# Patient Record
Sex: MALE
[De-identification: names, ages, dates, MRNs, and addresses within clinical notes are randomized per-mention and may not be internally consistent; named-entity substitution may affect disease eponyms.]

---

## 2022-08-02 ENCOUNTER — HOSPITAL ENCOUNTER (OUTPATIENT)
Dept: HOSPITAL 75 - PREOP | Age: 62
End: 2022-08-02
Attending: SPECIALIST
Payer: COMMERCIAL

## 2022-08-02 DIAGNOSIS — Z01.818: Primary | ICD-10-CM

## 2022-08-05 ENCOUNTER — HOSPITAL ENCOUNTER (OUTPATIENT)
Dept: HOSPITAL 75 - SDC | Age: 62
Discharge: HOME | End: 2022-08-05
Attending: SPECIALIST
Payer: COMMERCIAL

## 2022-08-05 VITALS — WEIGHT: 177.47 LBS | BODY MASS INDEX: 30.3 KG/M2 | HEIGHT: 64.02 IN

## 2022-08-05 VITALS — SYSTOLIC BLOOD PRESSURE: 104 MMHG | DIASTOLIC BLOOD PRESSURE: 69 MMHG

## 2022-08-05 DIAGNOSIS — H40.20X0: Primary | ICD-10-CM

## 2022-08-05 NOTE — OPHTHALMOLOGIST PRE-OP NOTE
Pre-Operative Progress Note


H&P Reviewed


The H&P was reviewed, patient examined and no changes noted.


Date H&P Reviewed:  Aug 5, 2022


Time H&P Reviewed:  11:30


Pre-Op Dx


Narrow angle glaucoma, Right Eye











SARAH TSANG MD              Aug 5, 2022 12:49

## 2022-08-05 NOTE — OPHTHALMOLOGY OPERATIVE REPORT
YAG Iridotomy


PREOPERATIVE DIAGNOSIS:   Narrow angle glaucoma, Right Eye


POSTOPERATIVE DIAGNOSIS: Narrow angle glaucoma, Right Eye





PROCEDURE: YAG Iridotomy right eye





SURGEON: Gagandeep Tsang 





ANESTHESIA: Topical anesthesia





COMPLICATIONS: None





ESTIMATED BLOOD LOSS: Minimal 





DESCRIPTION OF PROCEDURE:


After proper informed consent was obtained, that patients right eye received 

one drop of tetracaine and one drop of pilocarpine 4%.  The patient received 1 

drop of timolol 0.5% five minutes after the pilocarpine.  The iridotomy lens was

placed on the patients eye.  After this the patient was moved to the YAG laser 

and using a power of [ 7.0] millijoules [4 ] bursts were used to fashion a 

patent iridotomy.  The patient tolerated the procedure well without 

complications and the patients pressure was [ 15] shortly after the laser











GAGANDEEP TSANG MD              Aug 5, 2022 12:51

## 2022-08-16 ENCOUNTER — HOSPITAL ENCOUNTER (OUTPATIENT)
Dept: HOSPITAL 75 - PREOP | Age: 62
End: 2022-08-16
Attending: SPECIALIST
Payer: COMMERCIAL

## 2022-08-16 DIAGNOSIS — Z01.818: Primary | ICD-10-CM

## 2022-08-19 ENCOUNTER — HOSPITAL ENCOUNTER (OUTPATIENT)
Dept: HOSPITAL 75 - SDC | Age: 62
Discharge: HOME | End: 2022-08-19
Attending: SPECIALIST
Payer: COMMERCIAL

## 2022-08-19 VITALS — DIASTOLIC BLOOD PRESSURE: 70 MMHG | SYSTOLIC BLOOD PRESSURE: 99 MMHG

## 2022-08-19 VITALS — BODY MASS INDEX: 30.67 KG/M2 | HEIGHT: 63.94 IN | WEIGHT: 177.47 LBS

## 2022-08-19 DIAGNOSIS — H40.20X0: Primary | ICD-10-CM

## 2022-08-19 NOTE — OPHTHALMOLOGIST PRE-OP NOTE
Pre-Operative Progress Note


H&P Reviewed


The H&P was reviewed, patient examined and no changes noted.


Date H&P Reviewed:  Aug 19, 2022


Time H&P Reviewed:  10:22


Pre-Op Dx


PREOPERATIVE DIAGNOSIS:   Narrow angle glaucoma, Left Eye


POSTOPERATIVE DIAGNOSIS: Narrow angle glaucoma, Left Eye





PROCEDURE: YAG Iridotomy left eye





SURGEON: Gagandeep Tsang 





ANESTHESIA: Topical anesthesia





COMPLICATIONS: None





ESTIMATED BLOOD LOSS: Minimal 





DESCRIPTION OF PROCEDURE:


After proper informed consent was obtained, that patients left eye received one

drop of tetracaine and one drop of pilocarpine 4%.  The patient received 1 drop 

of timolol 0.5% five minutes after the pilocarpine.  The iridotomy lens was 

placed on the patients eye.  The patient was then placed at the argon laser and

using a power of [ ] milliwatts, duration of [ ], and spot size of [ ], [ ] 

burst were used to flatten the iris.  After this the patient was moved to the 

YAG laser and using a power of [ ] millijoules [ ] bursts were used to fashion a

patent iridotomy.  The patient tolerated the procedure well without 

complications and the patients pressure was [ ] shortly after the laser











GAGANDEEP TSANG MD             Aug 19, 2022 10:46 ,

## 2022-08-19 NOTE — OPHTHALMOLOGY OPERATIVE REPORT
YAG Iridotomy


PREOPERATIVE DIAGNOSIS:   Narrow angle glaucoma, Left Eye


POSTOPERATIVE DIAGNOSIS: Narrow angle glaucoma, Left Eye





PROCEDURE: YAG Iridotomy left eye





SURGEON: Gagandeep Tsang 





ANESTHESIA: Topical anesthesia





COMPLICATIONS: None





ESTIMATED BLOOD LOSS: Minimal 





DESCRIPTION OF PROCEDURE:


After proper informed consent was obtained, that patients left eye received one

drop of tetracaine and one drop of pilocarpine 4%.  The patient received 1 drop 

of timolol 0.5% five minutes after the pilocarpine.  The iridotomy lens was 

placed on the patients eye. After this the patient was moved to the YAG laser 

and using a power of [7.0 ] millijoules [5 ] bursts were used to fashion a 

patent iridotomy.  The patient tolerated the procedure well without 

complications and the patients pressure was [15 ] shortly after the laser











GAGANDEEP TSANG MD             Aug 19, 2022 10:46

## 2023-04-05 ENCOUNTER — HOSPITAL ENCOUNTER (OUTPATIENT)
Dept: HOSPITAL 75 - PREOP | Age: 63
LOS: 36 days | Discharge: HOME | End: 2023-05-11
Attending: SURGERY
Payer: COMMERCIAL

## 2023-04-05 VITALS — WEIGHT: 171.08 LBS | HEIGHT: 64.02 IN | BODY MASS INDEX: 29.21 KG/M2

## 2023-04-05 DIAGNOSIS — Z01.818: Primary | ICD-10-CM

## 2023-05-15 ENCOUNTER — HOSPITAL ENCOUNTER (OUTPATIENT)
Dept: HOSPITAL 75 - ENDO | Age: 63
Discharge: HOME | End: 2023-05-15
Attending: SURGERY
Payer: COMMERCIAL

## 2023-05-15 VITALS — BODY MASS INDEX: 29.21 KG/M2 | WEIGHT: 171.08 LBS | HEIGHT: 64.02 IN

## 2023-05-15 VITALS — DIASTOLIC BLOOD PRESSURE: 81 MMHG | SYSTOLIC BLOOD PRESSURE: 116 MMHG

## 2023-05-15 VITALS — SYSTOLIC BLOOD PRESSURE: 131 MMHG | DIASTOLIC BLOOD PRESSURE: 94 MMHG

## 2023-05-15 VITALS — DIASTOLIC BLOOD PRESSURE: 79 MMHG | SYSTOLIC BLOOD PRESSURE: 106 MMHG

## 2023-05-15 VITALS — SYSTOLIC BLOOD PRESSURE: 109 MMHG | DIASTOLIC BLOOD PRESSURE: 78 MMHG

## 2023-05-15 DIAGNOSIS — Z12.11: Primary | ICD-10-CM

## 2023-05-15 DIAGNOSIS — K64.8: ICD-10-CM

## 2023-05-15 DIAGNOSIS — E11.9: ICD-10-CM

## 2023-05-15 DIAGNOSIS — K57.30: ICD-10-CM

## 2023-05-15 DIAGNOSIS — Z79.84: ICD-10-CM

## 2023-05-15 DIAGNOSIS — Z87.891: ICD-10-CM

## 2023-05-15 PROCEDURE — 82947 ASSAY GLUCOSE BLOOD QUANT: CPT

## 2023-05-15 NOTE — PROGRESS NOTE-POST OPERATIVE
Post-Operative Progess Note


Surgeon (s)/Assistant (s)


Surgeon


ELIAN ROSE DO


Assistant:  RUTH Cisneros student





Pre-Operative Diagnosis


Screening colon





Post-Operative Diagnosis





Diverticula


int hemorrhoids





Procedure & Operative Findings


Date of Procedure


5/15/23


Procedure Performed/Findings


Colonoscopy 





PROCEDURE NOTE:


After informed consent was obtained, the patient was brought to the endoscopy


suite, placed in bed in left lateral decubitus position.  He  was administered


IV sedation by the CRNA who then monitored his vitals the entire time, heart


rate, blood pressure and pulse ox and the scope was inserted, pushed all the way


to about 150 cm and pushed into the cecum, took a picture of appendiceal orifice

and 


noted the ileocecal valve. Then slowly withdrew the scope insufflating to look 

circumferentially 


at the walls starting in the cecum, up the ascending colon to the hepatic 

flexure, then down the 


transverse colon, splenic flexure, into the descending colon down in the sigmoid

and then into the


rectal vault and retroflexed the scope. Took picture of the internal 

hemorrhoids.  





The patient tolerated the procedure.  He was recovered in endoscopy suite.





Recommended for repeat colonoscopy in 10 years.


Anesthesia Type


IV sedation by CRNA





Estimated Blood Loss


Estimated blood loss (mL):  scant





Specimens/Packing


Specimens Removed


none











ELIAN ROSE DO               May 15, 2023 11:06

## 2023-05-15 NOTE — ANESTHESIA-GENERAL POST-OP
MAC


Patient Condition


Mental Status/LOC:  Same as Preop


Cardiovascular:  Satisfactory


Nausea/Vomiting:  Absent


Respiratory:  Satisfactory


Pain:  Controlled


Complications:  Absent





Post Op Complications


Complications


None





Follow Up Care/Instructions


Patient Instructions


None needed.





Anesthesiology Discharge Order


Discharge Order


Patient is doing well, no complaints, stable vital signs, no apparent adverse 

anesthesia problems.   


No complications reported per nursing.











DESI LIN CRNA            May 15, 2023 11:11

## 2023-05-15 NOTE — ENDOSCOPY DISCHARGE INSTRUCT
Endo Procedure/Findings


Findings


1.:  Diverticulosis


2.:  Internal Hemorrhoids





Discharge Instructions


-


Activity: You might feel a little sleepy until tomorrow.  This is due to the 

medicine you received to relax you.





Until tomorrow, you should:  


   NOT drive a car, operate machinery or power tools.


   NOT drink any alcoholic beverages.


   NOT make any important decisions or sign importortant papers.





Do not return to work until tomorrow, unless otherwise instructed. Resume 

previous activities tomorrow.





Diet: Start by taking liquids.  If you tolerate liquids, advance to solid food.


1.:  Colonscopy in 10 years





Notify Physician


-


If you experience excessive bleeding, unusual abdominal pain, fever, or chest 

pain, contact your doctor immediately.





Follow-Up:


Other Follow up


in my office in one week











ELIAN ROSE DO               May 15, 2023 11:07

## 2023-05-15 NOTE — PROGRESS NOTE-PRE OPERATIVE
Pre-Operative Progress Note


Date of Available H&P:  Mar 14, 2023


Date H&P Reviewed:  May 15, 2023


Time H&P Reviewed:  09:42


History & Physical:  H&P Reviewed, Patient Examed, No changes noted


Pre-Operative Diagnosis:  Screening colon











ELIAN ROSE DO               May 15, 2023 09:48